# Patient Record
Sex: MALE | Race: WHITE | HISPANIC OR LATINO | URBAN - METROPOLITAN AREA
[De-identification: names, ages, dates, MRNs, and addresses within clinical notes are randomized per-mention and may not be internally consistent; named-entity substitution may affect disease eponyms.]

---

## 2018-04-07 ENCOUNTER — EMERGENCY (EMERGENCY)
Facility: HOSPITAL | Age: 58
LOS: 1 days | End: 2018-04-07
Payer: MEDICAID

## 2018-04-07 PROCEDURE — 99284 EMERGENCY DEPT VISIT MOD MDM: CPT

## 2018-04-09 ENCOUNTER — EMERGENCY (EMERGENCY)
Facility: HOSPITAL | Age: 58
LOS: 1 days | End: 2018-04-09
Payer: MEDICAID

## 2018-04-09 PROCEDURE — 99285 EMERGENCY DEPT VISIT HI MDM: CPT

## 2018-04-09 PROCEDURE — 76705 ECHO EXAM OF ABDOMEN: CPT | Mod: 26

## 2018-04-09 PROCEDURE — 71046 X-RAY EXAM CHEST 2 VIEWS: CPT | Mod: 26

## 2018-04-09 PROCEDURE — 74177 CT ABD & PELVIS W/CONTRAST: CPT | Mod: 26

## 2018-08-18 ENCOUNTER — EMERGENCY (EMERGENCY)
Facility: HOSPITAL | Age: 58
LOS: 1 days | End: 2018-08-18
Payer: MEDICAID

## 2018-08-18 PROCEDURE — 99284 EMERGENCY DEPT VISIT MOD MDM: CPT

## 2018-08-18 PROCEDURE — 71045 X-RAY EXAM CHEST 1 VIEW: CPT | Mod: 26

## 2018-10-25 ENCOUNTER — EMERGENCY (EMERGENCY)
Facility: HOSPITAL | Age: 58
LOS: 1 days | End: 2018-10-25
Payer: MEDICAID

## 2018-10-25 PROCEDURE — 71046 X-RAY EXAM CHEST 2 VIEWS: CPT | Mod: 26

## 2018-10-25 PROCEDURE — 99284 EMERGENCY DEPT VISIT MOD MDM: CPT

## 2018-12-26 ENCOUNTER — EMERGENCY (EMERGENCY)
Facility: HOSPITAL | Age: 58
LOS: 1 days | End: 2018-12-26

## 2019-02-06 ENCOUNTER — EMERGENCY (EMERGENCY)
Facility: HOSPITAL | Age: 59
LOS: 1 days | End: 2019-02-06
Admitting: EMERGENCY MEDICINE
Payer: MEDICAID

## 2019-02-06 PROCEDURE — 99284 EMERGENCY DEPT VISIT MOD MDM: CPT

## 2019-07-27 ENCOUNTER — EMERGENCY (EMERGENCY)
Facility: HOSPITAL | Age: 59
LOS: 1 days | End: 2019-07-27
Admitting: EMERGENCY MEDICINE
Payer: MEDICAID

## 2019-07-27 PROCEDURE — 99284 EMERGENCY DEPT VISIT MOD MDM: CPT

## 2019-07-27 PROCEDURE — 76705 ECHO EXAM OF ABDOMEN: CPT | Mod: 26

## 2019-07-27 PROCEDURE — 74177 CT ABD & PELVIS W/CONTRAST: CPT | Mod: 26

## 2019-09-24 ENCOUNTER — EMERGENCY (EMERGENCY)
Facility: HOSPITAL | Age: 59
LOS: 1 days | End: 2019-09-24
Admitting: EMERGENCY MEDICINE
Payer: MEDICAID

## 2019-09-24 PROCEDURE — 99284 EMERGENCY DEPT VISIT MOD MDM: CPT

## 2019-09-24 PROCEDURE — 73502 X-RAY EXAM HIP UNI 2-3 VIEWS: CPT | Mod: 26,RT

## 2019-10-17 PROBLEM — Z00.00 ENCOUNTER FOR PREVENTIVE HEALTH EXAMINATION: Status: ACTIVE | Noted: 2019-10-17

## 2019-10-18 ENCOUNTER — APPOINTMENT (OUTPATIENT)
Dept: NEUROSURGERY | Facility: CLINIC | Age: 59
End: 2019-10-18

## 2019-10-20 ENCOUNTER — EMERGENCY (EMERGENCY)
Facility: HOSPITAL | Age: 59
LOS: 1 days | End: 2019-10-20
Admitting: EMERGENCY MEDICINE
Payer: MEDICAID

## 2019-10-20 PROCEDURE — 71045 X-RAY EXAM CHEST 1 VIEW: CPT | Mod: 26

## 2019-10-20 PROCEDURE — 99285 EMERGENCY DEPT VISIT HI MDM: CPT

## 2019-10-21 PROCEDURE — 93010 ELECTROCARDIOGRAM REPORT: CPT

## 2019-11-12 ENCOUNTER — EMERGENCY (EMERGENCY)
Facility: HOSPITAL | Age: 59
LOS: 1 days | End: 2019-11-12
Admitting: EMERGENCY MEDICINE
Payer: MEDICAID

## 2019-11-12 PROCEDURE — 99283 EMERGENCY DEPT VISIT LOW MDM: CPT

## 2019-11-12 PROCEDURE — 73140 X-RAY EXAM OF FINGER(S): CPT | Mod: 26,LT,76

## 2019-12-24 ENCOUNTER — APPOINTMENT (OUTPATIENT)
Dept: NEUROSURGERY | Facility: CLINIC | Age: 59
End: 2019-12-24
Payer: MEDICAID

## 2019-12-24 VITALS
SYSTOLIC BLOOD PRESSURE: 126 MMHG | DIASTOLIC BLOOD PRESSURE: 87 MMHG | HEART RATE: 89 BPM | WEIGHT: 152 LBS | BODY MASS INDEX: 23.86 KG/M2 | HEIGHT: 67 IN

## 2019-12-24 DIAGNOSIS — Z78.9 OTHER SPECIFIED HEALTH STATUS: ICD-10-CM

## 2019-12-24 PROCEDURE — 99204 OFFICE O/P NEW MOD 45 MIN: CPT

## 2019-12-24 NOTE — PLAN
[FreeTextEntry1] : This is a 59 year old male who presents to the office today with complaints of axial back pain with intermittent left lumbar radiculopathy. His exam reveals weakness with dorsiflexion on the left as well as decreased sensation to light touch among the L5 and S1 nerve dermatome. He unfortunately does not have an updated MRI for my review today. I've recommended obtaining a new MR for further evaluation of the L4-5 and L5-S1 levels. Once the MR has been performed we can have him back in the office to review the images. I've also recommended continued antiinflammatory. I can only give him Tramadol as needed for pain at this time as he has not yet had surgery and may not be a surgical candidate. He should otherwise follow-up with his primary care physician.

## 2019-12-24 NOTE — REASON FOR VISIT
[New Patient Visit] : a new patient visit [Referred By: _________] : Patient was referred by [place] [FreeTextEntry1] : Lower back pain.
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
## 2019-12-24 NOTE — PHYSICAL EXAM
[General Appearance - Alert] : alert [General Appearance - Well Nourished] : well nourished [General Appearance - In No Acute Distress] : in no acute distress [General Appearance - Well Developed] : well developed [General Appearance - Well-Appearing] : healthy appearing [] : normal voice and communication [Time] : oriented to time [Place] : oriented to place [Person] : oriented to person [Cranial Nerves Trigeminal (V)] : facial sensation intact symmetrically [Cranial Nerves Optic (II)] : visual acuity intact bilaterally,  pupils equal round and reactive to light [Cranial Nerves Oculomotor (III)] : extraocular motion intact [Cranial Nerves Glossopharyngeal (IX)] : tongue and palate midline [Cranial Nerves Vestibulocochlear (VIII)] : hearing was intact bilaterally [Cranial Nerves Facial (VII)] : face symmetrical [Cranial Nerves Hypoglossal (XII)] : there was no tongue deviation with protrusion [Motor Tone] : muscle tone was normal in all four extremities [Cranial Nerves Accessory (XI - Cranial And Spinal)] : head turning and shoulder shrug symmetric [Involuntary Movements] : no involuntary movements were seen [No Muscle Atrophy] : normal bulk in all four extremities [Motor Handedness Left-Handed] : the patient is left hand dominant [Sensation Tactile Decrease] : light touch was intact [Abnormal Walk] : normal gait [Proprioception] : proprioception was intact [Sensation Vibration Decrease] : vibration was intact [Balance] : balance was intact [2+] : Patella left 2+ [Able to toe walk] : the patient was able to toe walk [Normal] : normal [Able to heel walk] : the patient was able to heel walk [Pain / Temp Decrease Sole Of Foot & Posterior Leg Bilateral] : S1 sensory impairment [4] : 4/5 Great Toe Extension (L5) [2] : 2/4 Knee Jerk Reflex [5] : 5/5 Ankle Plantar Flexion (S1) [0] : 0/4 Plantar Reflex

## 2020-01-24 ENCOUNTER — APPOINTMENT (OUTPATIENT)
Dept: NEUROSURGERY | Facility: CLINIC | Age: 60
End: 2020-01-24
Payer: MEDICAID

## 2020-01-24 DIAGNOSIS — M54.9 DORSALGIA, UNSPECIFIED: ICD-10-CM

## 2020-01-24 PROCEDURE — 99214 OFFICE O/P EST MOD 30 MIN: CPT

## 2020-01-24 NOTE — PHYSICAL EXAM
[General Appearance - Alert] : alert [General Appearance - In No Acute Distress] : in no acute distress [General Appearance - Well Nourished] : well nourished [General Appearance - Well-Appearing] : healthy appearing [General Appearance - Well Developed] : well developed [] : normal voice and communication [Balance] : balance was intact [Antalgic] : antalgic [FreeTextEntry1] : well appearing male

## 2020-01-24 NOTE — ASSESSMENT
[FreeTextEntry1] : This is a 59 year old male with complaints of chronic back pain. He is neurologically intact on exam. His MRI is essentially normal and there is no role for any surgical intervention. He can follow-up with a pain management to better control his pain and continue to follow-up with his primary care doctor as regularly scheduled. He is otherwise DISCHARGED FROM MY POINT OF VIEW. 
Patient/Caregiver provided printed discharge information.

## 2020-01-24 NOTE — HISTORY OF PRESENT ILLNESS
[FreeTextEntry1] : Mr. Ko presents to the office today to review the MRI of the lumbar spine. He has been dealing with back pain for some time and has been through injections without any relief. He denies any new symptoms since his previous visit.

## 2020-02-24 ENCOUNTER — EMERGENCY (EMERGENCY)
Facility: HOSPITAL | Age: 60
LOS: 1 days | End: 2020-02-24
Admitting: EMERGENCY MEDICINE
Payer: MEDICAID

## 2020-02-24 PROCEDURE — 70450 CT HEAD/BRAIN W/O DYE: CPT | Mod: 26

## 2020-02-24 PROCEDURE — 99285 EMERGENCY DEPT VISIT HI MDM: CPT

## 2020-02-24 PROCEDURE — 71045 X-RAY EXAM CHEST 1 VIEW: CPT | Mod: 26

## 2020-02-24 PROCEDURE — 72125 CT NECK SPINE W/O DYE: CPT | Mod: 26

## 2020-02-26 ENCOUNTER — EMERGENCY (EMERGENCY)
Facility: HOSPITAL | Age: 60
LOS: 1 days | End: 2020-02-26
Admitting: EMERGENCY MEDICINE
Payer: MEDICAID

## 2020-02-26 PROCEDURE — 70450 CT HEAD/BRAIN W/O DYE: CPT | Mod: 26

## 2020-02-26 PROCEDURE — 99284 EMERGENCY DEPT VISIT MOD MDM: CPT

## 2021-02-20 ENCOUNTER — EMERGENCY (EMERGENCY)
Facility: HOSPITAL | Age: 61
LOS: 1 days | End: 2021-02-20
Admitting: EMERGENCY MEDICINE
Payer: MEDICAID

## 2021-02-20 PROCEDURE — 99283 EMERGENCY DEPT VISIT LOW MDM: CPT

## 2022-07-11 ENCOUNTER — EMERGENCY (EMERGENCY)
Facility: HOSPITAL | Age: 62
LOS: 1 days | Discharge: HOME | End: 2022-07-11
Admitting: EMERGENCY MEDICINE

## 2022-07-11 DIAGNOSIS — R20.2 PARESTHESIA OF SKIN: ICD-10-CM

## 2022-07-11 DIAGNOSIS — M79.661 PAIN IN RIGHT LOWER LEG: ICD-10-CM

## 2022-07-11 DIAGNOSIS — Z98.890 OTHER SPECIFIED POSTPROCEDURAL STATES: ICD-10-CM

## 2022-07-11 PROCEDURE — 99283 EMERGENCY DEPT VISIT LOW MDM: CPT

## 2022-12-22 ENCOUNTER — APPOINTMENT (OUTPATIENT)
Dept: ORTHOPEDIC SURGERY | Facility: CLINIC | Age: 62
End: 2022-12-22
Payer: OTHER MISCELLANEOUS

## 2022-12-22 DIAGNOSIS — S54.21XA INJURY OF RADIAL NERVE AT FOREARM LEVEL, RIGHT ARM, INITIAL ENCOUNTER: ICD-10-CM

## 2022-12-22 PROCEDURE — 99203 OFFICE O/P NEW LOW 30 MIN: CPT

## 2022-12-22 PROCEDURE — 99072 ADDL SUPL MATRL&STAF TM PHE: CPT

## 2022-12-22 NOTE — PHYSICAL EXAM
[de-identified] : - Constitutional: This is a male in no obvious distress.  \par - Psych: Patient is alert and oriented to person, place and time.  Patient has a normal mood and affect.\par - Cardiovascular: Normal pulses throughout the upper extremities.  No significant varicosities are noted in the upper extremities. \par - Respiratory:  Patient exhibits no evidence of shortness of breath or difficulty breathing.\par ---\par \par Examination of her right hand demonstrates a small what he states is bite wound along the dorsal radial aspect of the hand.  There is no swelling or evidence of infection.  He has complaints of numbness along the dorsal radial aspect of the hand.  He also has complaints of numbness along the median nerve distribution with normal sensation along the ulnar nerve distribution.  Provocative signs for carpal tunnel syndrome are negative.  There is a negative Tinel's sign overlying the dorsal radial sensory nerve branch.
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
## 2023-04-17 ENCOUNTER — APPOINTMENT (OUTPATIENT)
Dept: ORTHOPEDIC SURGERY | Facility: CLINIC | Age: 63
End: 2023-04-17
Payer: OTHER MISCELLANEOUS

## 2023-04-17 VITALS — WEIGHT: 158 LBS | HEIGHT: 67 IN | BODY MASS INDEX: 24.8 KG/M2

## 2023-04-17 PROCEDURE — 72100 X-RAY EXAM L-S SPINE 2/3 VWS: CPT

## 2023-04-17 PROCEDURE — 99244 OFF/OP CNSLTJ NEW/EST MOD 40: CPT

## 2023-04-17 RX ORDER — IBUPROFEN 800 MG/1
800 TABLET, FILM COATED ORAL
Refills: 0 | Status: DISCONTINUED | COMMUNITY
End: 2023-04-17

## 2023-04-17 RX ORDER — IBUPROFEN 600 MG/1
600 TABLET, FILM COATED ORAL
Refills: 0 | Status: DISCONTINUED | COMMUNITY
End: 2023-04-17

## 2023-04-17 RX ORDER — TRAMADOL HYDROCHLORIDE 50 MG/1
50 TABLET, COATED ORAL
Qty: 21 | Refills: 0 | Status: DISCONTINUED | COMMUNITY
Start: 2019-12-24 | End: 2023-04-17

## 2023-04-17 RX ORDER — METHOCARBAMOL 750 MG/1
750 TABLET, FILM COATED ORAL 3 TIMES DAILY
Qty: 90 | Refills: 0 | Status: ACTIVE | COMMUNITY
Start: 2023-04-17 | End: 1900-01-01

## 2023-04-17 RX ORDER — GABAPENTIN 100 MG/1
CAPSULE ORAL
Refills: 0 | Status: DISCONTINUED | COMMUNITY
End: 2023-04-17

## 2023-04-17 RX ORDER — OXYCODONE HYDROCHLORIDE AND ACETAMINOPHEN 10; 325 MG/1; MG/1
TABLET ORAL
Refills: 0 | Status: DISCONTINUED | COMMUNITY
End: 2023-04-17

## 2023-04-17 NOTE — DATA REVIEWED
[CT Scan] : CT scan [Cervical Spine] : cervical spine [Report was reviewed and noted in the chart] : The report was reviewed and noted in the chart [I independently reviewed and interpreted images and report] : I independently reviewed and interpreted images and report [FreeTextEntry1] : No fractures noted\par Disc osteophyte complex C5-6 \par Kyphosis

## 2023-04-17 NOTE — ASSESSMENT
[FreeTextEntry1] : Patient will begin Medrol.  Patient advised not to take any NSAIDs while taking the steroids. \par \par Patient given prescription for MRI, follow up after study is completed to discuss results. \par \par Patient will begin physical therapy. \par \par Recommend: - NSAID - Heating pad - Muscle relaxer - Neck stretching exercise - Soft cervical collar - Cervical traction Patient is given neck rehabilitation exercise book. \par \par Recommend: - NSAID - Heating pad - Muscle relaxer - Core strengthening exercise - Hamstring stretching exercise Patient is given back rehabilitation exercise book. \par \par Worker's compensation 100% temporarily disabled
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
## 2023-05-01 ENCOUNTER — APPOINTMENT (OUTPATIENT)
Dept: ORTHOPEDIC SURGERY | Facility: CLINIC | Age: 63
End: 2023-05-01
Payer: OTHER MISCELLANEOUS

## 2023-05-01 ENCOUNTER — NON-APPOINTMENT (OUTPATIENT)
Age: 63
End: 2023-05-01

## 2023-05-01 VITALS — BODY MASS INDEX: 24.8 KG/M2 | HEIGHT: 67 IN | WEIGHT: 158 LBS

## 2023-05-01 PROCEDURE — 99215 OFFICE O/P EST HI 40 MIN: CPT

## 2023-05-01 RX ORDER — TIZANIDINE 4 MG/1
4 TABLET ORAL EVERY 8 HOURS
Qty: 60 | Refills: 0 | Status: ACTIVE | COMMUNITY
Start: 2023-05-01 | End: 1900-01-01

## 2023-05-01 RX ORDER — MELOXICAM 15 MG/1
15 TABLET ORAL
Qty: 30 | Refills: 1 | Status: ACTIVE | COMMUNITY
Start: 2023-05-01 | End: 1900-01-01

## 2023-05-01 NOTE — DISCUSSION/SUMMARY
[Surgical risks reviewed] : Surgical risks reviewed [de-identified] : I discussed non operative and operative treatment options in great detail with the patient. I discussed treatment options, including but not limited to,\par 1. Non operative treatment - rest, NSAID, physical therapy etc.\par 2. Interventional treatment - injections etc.\par 3. Surgical treatment - Right carpal tunnel release \par \par Patient wants to proceed with surgical intervention after failing nonoperative care. I had a lengthy discussion with the patient about the rational and goal of the surgery as well as expected outcome.  I explained postoperative rehabilitation and recovery process to the patient. I discussed risks, benefits and alternatives of the procedure in detail with the patient. I counseled patient about risks and possible complications, including but not limited to, infection, bleeding, nerve injury, arterial and venous injury, single or multiple muscle group weakness, hematoma, DVT, PE, CRPS, MI, wound dehiscence, pillar pain, persistent symptoms, and risks of anesthesia. I explained to the patient that the surgical outcome is unpredictable and there is no guarantee that the symptoms will resolve after the surgery. The patient understands and wishes to proceed. All questions were answered and patient was given information to review.\par
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
## 2023-05-01 NOTE — ASSESSMENT
[FreeTextEntry1] : Patient with symptomatic right carpal tunnel syndrome.\par \par Patient with persistent symptoms refractory to non operative care. Patient is seeking surgical options. Patient is a candidate for surgery after failing extensive non operative care.\par \par Patient given prescription for MRI, follow up after study is completed to discuss results. \par \par Patient will begin physical therapy. \par \par Recommend using heating pad daily.\par \par Robaxin giving patient upset stomach, will switch to tizanidine.\par \par Recommend: - NSAID - Heating pad - Muscle relaxer - Neck stretching exercise - Soft cervical collar - Cervical traction Patient is given neck rehabilitation exercise book. \par \par Recommend: - NSAID - Heating pad - Muscle relaxer - Core strengthening exercise - Hamstring stretching exercise Patient is given back rehabilitation exercise book. \par \par Worker's compensation 100% temporarily disabled

## 2023-05-01 NOTE — DATA REVIEWED
[EMG Nerve Conduction] : A EMG Nerve Conduction test was completed of the [Bilateral] : bilateral [Upper extremity] : upper extremity [Positive] : positive [Consistent with peripheral neuropathy] : consistent with peripheral neuropathy [FreeTextEntry1] : mild bilateral carpal tunnel syndrome

## 2023-05-05 ENCOUNTER — FORM ENCOUNTER (OUTPATIENT)
Age: 63
End: 2023-05-05

## 2023-05-06 ENCOUNTER — RESULT REVIEW (OUTPATIENT)
Age: 63
End: 2023-05-06

## 2023-05-06 ENCOUNTER — APPOINTMENT (OUTPATIENT)
Dept: MRI IMAGING | Facility: CLINIC | Age: 63
End: 2023-05-06
Payer: OTHER MISCELLANEOUS

## 2023-05-06 PROCEDURE — 72148 MRI LUMBAR SPINE W/O DYE: CPT

## 2023-05-06 PROCEDURE — 72141 MRI NECK SPINE W/O DYE: CPT

## 2023-05-15 ENCOUNTER — APPOINTMENT (OUTPATIENT)
Dept: ORTHOPEDIC SURGERY | Facility: CLINIC | Age: 63
End: 2023-05-15
Payer: OTHER MISCELLANEOUS

## 2023-05-15 VITALS — BODY MASS INDEX: 24.8 KG/M2 | WEIGHT: 158 LBS | HEIGHT: 67 IN

## 2023-05-15 PROCEDURE — 99215 OFFICE O/P EST HI 40 MIN: CPT
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
## 2023-05-15 NOTE — ASSESSMENT
[FreeTextEntry1] : Right HNP C4-6\par Central HNP C3-4\par \par Referral to pain management for injections, follow up 2 weeks after injection. \par \par Patient will begin physical therapy. \par \par Recommend using heating pad daily.\par \par Patient given RX for soft cervical collar.\par \par Recommend: - NSAID - Heating pad - Muscle relaxer - Neck stretching exercise - Soft cervical collar - Cervical traction Patient is given neck rehabilitation exercise book. \par \par Worker's compensation 100% temporarily disabled \par \par Follow up in 2 months

## 2023-05-15 NOTE — DATA REVIEWED
[MRI] : MRI [Cervical Spine] : cervical spine [Report was reviewed and noted in the chart] : The report was reviewed and noted in the chart [I independently reviewed and interpreted images and report] : I independently reviewed and interpreted images and report [FreeTextEntry1] : Right HNP C4-6\par Central HNP C3-4

## 2023-05-25 ENCOUNTER — APPOINTMENT (OUTPATIENT)
Dept: ORTHOPEDIC SURGERY | Facility: CLINIC | Age: 63
End: 2023-05-25
Payer: OTHER MISCELLANEOUS

## 2023-05-25 VITALS — BODY MASS INDEX: 24.8 KG/M2 | HEIGHT: 67 IN | WEIGHT: 158 LBS

## 2023-05-25 PROCEDURE — 99215 OFFICE O/P EST HI 40 MIN: CPT | Mod: 25

## 2023-05-25 PROCEDURE — 20526 THER INJECTION CARP TUNNEL: CPT | Mod: RT

## 2023-05-25 NOTE — PROCEDURE
[Carpal Tunnel] : carpal tunnel [Right] : of the right [Other: ____] : [unfilled] [Pain] : pain [Inflammation] : inflammation [Betadine] : betadine [Ethyl Chloride sprayed topically] : ethyl chloride sprayed topically [Sterile technique used] : sterile technique used [___ cc    1%] : Lidocaine ~Vcc of 1%  [___ cc    40mg] : Triamcinolone (Kenalog) ~Vcc of 40 mg  [] : Patient tolerated procedure well [Call if redness, pain or fever occur] : call if redness, pain or fever occur [Apply ice for 15min out of every hour for the next 12-24 hours as tolerated] : apply ice for 15 minutes out of every hour for the next 12-24 hours as tolerated [Patient was advised to rest the joint(s) for ____ days] : patient was advised to rest the joint(s) for [unfilled] days [Previous OTC use and PT nontherapeutic] : patient has tried OTC's including aspirin, Ibuprofen, Aleve, etc or prescription NSAIDS, and/or exercises at home and/or physical therapy without satisfactory response [Patient had decreased mobility in the joint] : patient had decreased mobility in the joint [Risks, benefits, alternatives discussed / Verbal consent obtained] : the risks benefits, and alternatives have been discussed, and verbal consent was obtained
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
## 2023-05-25 NOTE — ASSESSMENT
[FreeTextEntry1] : Right HNP C4-6\par Central HNP C3-4\par \par Patient will continue physical therapy. \par \par Recommend: - NSAID - Heating pad - Muscle relaxer - Neck stretching exercise - Soft cervical collar - Cervical traction Patient is given neck rehabilitation exercise book. \par \par Worker's compensation 100% temporarily disabled \par \par Follow up in 2 months

## 2023-07-05 ENCOUNTER — APPOINTMENT (OUTPATIENT)
Dept: ORTHOPEDIC SURGERY | Facility: CLINIC | Age: 63
End: 2023-07-05
Payer: OTHER MISCELLANEOUS

## 2023-07-05 VITALS — BODY MASS INDEX: 24.8 KG/M2 | WEIGHT: 158 LBS | HEIGHT: 67 IN

## 2023-07-05 PROCEDURE — 99215 OFFICE O/P EST HI 40 MIN: CPT

## 2023-07-05 NOTE — ASSESSMENT
[FreeTextEntry1] : Surgery - Right carpal tunnel release.\par \par Right HNP C4-6\par Central HNP C3-4\par \par Despite patient EMG results showing only mild carpal tunnel, patient has severe pain, numbness, and  strength weakness.  Patient underwent right carpal tunnel injection with 50% relief for 4 weeks.  Patients symptoms have returned.  Patient is unable to have another cortisone injection at this time.  After failing all conservative treatment over the last 8 months, patient is a candidate for right carpal tunnel release.\par \par Patient will continue physical therapy. \par \par Recommend: - NSAID - Heating pad - Muscle relaxer - Neck stretching exercise - Soft cervical collar - Cervical traction Patient is given neck rehabilitation exercise book. \par \par Worker's compensation 100% temporarily disabled \par \par Follow up in 2 months
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
## 2023-07-05 NOTE — DISCUSSION/SUMMARY
[Surgical risks reviewed] : Surgical risks reviewed [de-identified] : Surgical risks reviewed. I discussed non operative and operative treatment options in great detail with the patient. I discussed treatment options, including but not limited to,\par 1. Non operative treatment - rest, NSAID, physical therapy etc.\par 2. Interventional treatment - injections etc.\par 3. Surgical treatment - Right carpal tunnel release \par \par Patient wants to proceed with surgical intervention after failing nonoperative care. I had a lengthy discussion with the patient about the rational and goal of the surgery as well as expected outcome. I explained postoperative rehabilitation and recovery process to the patient. I discussed risks, benefits and alternatives of the procedure in detail with the patient. I counseled patient about risks and possible complications, including but not limited to, infection, bleeding, nerve injury, arterial and venous injury, single or multiple muscle group weakness, hematoma, DVT, PE, CRPS, MI, wound dehiscence, pillar pain, persistent symptoms, and risks of anesthesia. I explained to the patient that the surgical outcome is unpredictable and there is no guarantee that the symptoms will resolve after the surgery. The patient understands and wishes to proceed. All questions were answered and patient was given information to review.

## 2023-07-17 ENCOUNTER — APPOINTMENT (OUTPATIENT)
Dept: ORTHOPEDIC SURGERY | Facility: CLINIC | Age: 63
End: 2023-07-17
Payer: OTHER MISCELLANEOUS

## 2023-07-17 VITALS — BODY MASS INDEX: 24.8 KG/M2 | HEIGHT: 67 IN | WEIGHT: 158 LBS

## 2023-07-17 DIAGNOSIS — S33.5XXA SPRAIN OF LIGAMENTS OF LUMBAR SPINE, INITIAL ENCOUNTER: ICD-10-CM

## 2023-07-17 PROCEDURE — 99215 OFFICE O/P EST HI 40 MIN: CPT

## 2023-07-17 RX ORDER — PREGABALIN 50 MG/1
50 CAPSULE ORAL TWICE DAILY
Qty: 60 | Refills: 1 | Status: ACTIVE | COMMUNITY
Start: 2023-07-17 | End: 1900-01-01
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
## 2023-07-17 NOTE — ASSESSMENT
[FreeTextEntry1] : Right HNP C4-6\par Central HNP C3-4\par \par Waiting for approval for carpal tunnel release \par \par Referral to pain management for injections, follow up 2 weeks after injection. \par \par Patient will begin physical therapy. \par \par Recommend using heating pad daily.\par \par Recommend: - NSAID - Heating pad - Muscle relaxer - Neck stretching exercise - Soft cervical collar - Cervical traction Patient is given neck rehabilitation exercise book. \par \par Worker's compensation 100% temporarily disabled \par \par Follow up in 2 months

## 2023-08-02 ENCOUNTER — RESULT CHARGE (OUTPATIENT)
Age: 63
End: 2023-08-02

## 2023-08-03 DIAGNOSIS — Z01.818 ENCOUNTER FOR OTHER PREPROCEDURAL EXAMINATION: ICD-10-CM

## 2023-08-09 ENCOUNTER — APPOINTMENT (OUTPATIENT)
Dept: RADIOLOGY | Facility: CLINIC | Age: 63
End: 2023-08-09
Payer: OTHER MISCELLANEOUS

## 2023-08-09 ENCOUNTER — LABORATORY RESULT (OUTPATIENT)
Age: 63
End: 2023-08-09

## 2023-08-09 ENCOUNTER — RESULT REVIEW (OUTPATIENT)
Age: 63
End: 2023-08-09

## 2023-08-09 PROCEDURE — 71046 X-RAY EXAM CHEST 2 VIEWS: CPT

## 2023-08-16 ENCOUNTER — APPOINTMENT (OUTPATIENT)
Dept: ORTHOPEDIC SURGERY | Facility: AMBULATORY SURGERY CENTER | Age: 63
End: 2023-08-16
Payer: OTHER MISCELLANEOUS

## 2023-08-16 PROCEDURE — 64721 CARPAL TUNNEL SURGERY: CPT | Mod: RT

## 2023-08-16 PROCEDURE — 64721 CARPAL TUNNEL SURGERY: CPT | Mod: AS,RT

## 2023-08-16 RX ORDER — OXYCODONE AND ACETAMINOPHEN 5; 325 MG/1; MG/1
5-325 TABLET ORAL
Qty: 28 | Refills: 0 | Status: ACTIVE | COMMUNITY
Start: 2023-08-16 | End: 1900-01-01

## 2023-09-07 ENCOUNTER — APPOINTMENT (OUTPATIENT)
Dept: ORTHOPEDIC SURGERY | Facility: CLINIC | Age: 63
End: 2023-09-07
Payer: OTHER MISCELLANEOUS

## 2023-09-07 VITALS — WEIGHT: 158 LBS | HEIGHT: 67 IN | BODY MASS INDEX: 24.8 KG/M2

## 2023-09-07 PROCEDURE — 99024 POSTOP FOLLOW-UP VISIT: CPT

## 2023-09-07 NOTE — HISTORY OF PRESENT ILLNESS
[de-identified] : S/P Right Carpal Tunnel Release on 8/16/23. Patient denies having fevers, chills, SOB. Patient states he feels extreme pain in the palm of his hand. Patient states he finished his Percocet. Patient states he is currently not taking anything for pain. Patient wearing sling.  Today's Pain 9.5/10

## 2023-09-07 NOTE — ASSESSMENT
[FreeTextEntry1] : Right HNP C4-6 Central HNP C3-4  62 y/o patient here today for follow up after carpal tunnel release surgery on 8/16/2023. Patient doing well, reports pain but state that the numbness and tingling have improved significantly.  - Sutures removed in office, streris applied  - Rx given for hand therapy today, focus on stretching and strengthening.  - Recommend: - rest - ice - compression - elevation  Back: Recommend: - NSAID - Heating pad - Muscle relaxer - Neck stretching exercise - Soft cervical collar - Cervical traction Patient is given neck rehabilitation exercise book.   Worker's compensation 100% temporarily disabled   Follow up in 2 months

## 2023-09-07 NOTE — PHYSICAL EXAM
[Bilateral] : hand bilaterally [] : clean and dry incisions [FreeTextEntry3] : R hand incision from carpal tunnel release healed, sutures removed [de-identified] : Positive tinels and phalen on L

## 2023-09-18 ENCOUNTER — APPOINTMENT (OUTPATIENT)
Dept: ORTHOPEDIC SURGERY | Facility: CLINIC | Age: 63
End: 2023-09-18
Payer: OTHER MISCELLANEOUS

## 2023-09-18 PROCEDURE — 99215 OFFICE O/P EST HI 40 MIN: CPT | Mod: 24

## 2023-09-18 RX ORDER — METHYLPREDNISOLONE 4 MG/1
4 TABLET ORAL
Qty: 1 | Refills: 0 | Status: ACTIVE | COMMUNITY
Start: 2023-04-17 | End: 1900-01-01

## 2023-10-12 ENCOUNTER — APPOINTMENT (OUTPATIENT)
Dept: ORTHOPEDIC SURGERY | Facility: CLINIC | Age: 63
End: 2023-10-12
Payer: OTHER MISCELLANEOUS

## 2023-10-12 PROCEDURE — 99214 OFFICE O/P EST MOD 30 MIN: CPT | Mod: 24

## 2023-10-12 RX ORDER — GABAPENTIN 300 MG/1
300 CAPSULE ORAL 3 TIMES DAILY
Qty: 90 | Refills: 1 | Status: ACTIVE | COMMUNITY
Start: 2023-05-01 | End: 1900-01-01

## 2023-10-16 ENCOUNTER — APPOINTMENT (OUTPATIENT)
Dept: ORTHOPEDIC SURGERY | Facility: CLINIC | Age: 63
End: 2023-10-16
Payer: OTHER MISCELLANEOUS

## 2023-10-16 VITALS — HEIGHT: 67 IN | BODY MASS INDEX: 24.8 KG/M2 | WEIGHT: 158 LBS

## 2023-10-16 DIAGNOSIS — G56.02 CARPAL TUNNEL SYNDROME, LEFT UPPER LIMB: ICD-10-CM

## 2023-10-16 DIAGNOSIS — G56.01 CARPAL TUNNEL SYNDROME, RIGHT UPPER LIMB: ICD-10-CM

## 2023-10-16 PROCEDURE — 99214 OFFICE O/P EST MOD 30 MIN: CPT | Mod: 24

## 2023-10-16 RX ORDER — GABAPENTIN 100 MG/1
100 CAPSULE ORAL 3 TIMES DAILY
Qty: 90 | Refills: 1 | Status: ACTIVE | COMMUNITY
Start: 2023-10-16 | End: 1900-01-01

## 2023-10-18 NOTE — REVIEW OF SYSTEMS
[Negative] : Allergic/Immunologic [Left] : left [de-identified] : See HPI Drysol Counseling:  I discussed with the patient the risks of drysol/aluminum chloride including but not limited to skin rash, itching, irritation, burning.

## 2023-11-01 ENCOUNTER — APPOINTMENT (OUTPATIENT)
Dept: ORTHOPEDIC SURGERY | Facility: CLINIC | Age: 63
End: 2023-11-01

## 2023-11-02 ENCOUNTER — APPOINTMENT (OUTPATIENT)
Dept: ORTHOPEDIC SURGERY | Facility: CLINIC | Age: 63
End: 2023-11-02
Payer: MEDICAID

## 2023-11-02 VITALS — WEIGHT: 158 LBS | HEIGHT: 67 IN | BODY MASS INDEX: 24.8 KG/M2

## 2023-11-02 PROCEDURE — 99214 OFFICE O/P EST MOD 30 MIN: CPT

## 2023-11-13 ENCOUNTER — APPOINTMENT (OUTPATIENT)
Dept: ORTHOPEDIC SURGERY | Facility: CLINIC | Age: 63
End: 2023-11-13
Payer: OTHER MISCELLANEOUS

## 2023-11-13 VITALS — WEIGHT: 158 LBS | HEIGHT: 67 IN | BODY MASS INDEX: 24.8 KG/M2

## 2023-11-13 PROCEDURE — 99214 OFFICE O/P EST MOD 30 MIN: CPT | Mod: 24

## 2023-12-22 RX ORDER — GABAPENTIN 600 MG/1
600 TABLET, COATED ORAL 3 TIMES DAILY
Qty: 90 | Refills: 2 | Status: ACTIVE | COMMUNITY
Start: 2023-12-22 | End: 1900-01-01

## 2024-01-15 ENCOUNTER — APPOINTMENT (OUTPATIENT)
Dept: ORTHOPEDIC SURGERY | Facility: CLINIC | Age: 64
End: 2024-01-15
Payer: OTHER MISCELLANEOUS

## 2024-01-15 PROCEDURE — 99214 OFFICE O/P EST MOD 30 MIN: CPT

## 2024-01-15 RX ORDER — DICLOFENAC SODIUM 1% 10 MG/G
1 GEL TOPICAL 4 TIMES DAILY
Qty: 1 | Refills: 2 | Status: ACTIVE | COMMUNITY
Start: 2024-01-15 | End: 1900-01-01

## 2024-01-15 NOTE — ASSESSMENT
[FreeTextEntry1] : Patient presents today for follow up on his neck pain. Still awaiting approval for surgery from . Patient having decreases in strength and ROM, as well as sensation. Disucssed with patient at length that ACDF surgery is still strongly recommended at this time. Patient agrees with this plan.   NECK - Patient given a prescription for Lyrica 50mg BID today for their nerve pain. Advised patient on potential side effects, including drowsiness.  - Prescription given for Voltaren gel today. Advised patient to apply to the area of pain as needed.    - Renewed Metaxalone 800mg TID today  - Waiting for authorization for ACDF  - Recommend NSAIDs PRN - Recommend heating pad use to decrease muscle spasm - Discussed the importance of home exercises, including but not limited to neck stretching and cervical traction  Patient was educated on their diagnosis today. All questions answered and patient expressed understanding.  Follow up PRN then 2 weeks after surgery.

## 2024-01-15 NOTE — PHYSICAL EXAM
[Bilateral] : hand bilaterally [] : no atrophy [FreeTextEntry3] : R hand incision from carpal tunnel release healed, sutures removed [de-identified] : TTP over scar  [de-identified] : Positive tinels and phalen on L

## 2024-02-26 ENCOUNTER — APPOINTMENT (OUTPATIENT)
Dept: ORTHOPEDIC SURGERY | Facility: CLINIC | Age: 64
End: 2024-02-26
Payer: OTHER MISCELLANEOUS

## 2024-02-26 DIAGNOSIS — M50.320 OTHER CERVICAL DISC DEGENERATION, MID-CERVICAL REGION, UNSPECIFIED LEVEL: ICD-10-CM

## 2024-02-26 DIAGNOSIS — M43.6 TORTICOLLIS: ICD-10-CM

## 2024-02-26 DIAGNOSIS — M51.36 OTHER INTERVERTEBRAL DISC DEGENERATION, LUMBAR REGION: ICD-10-CM

## 2024-02-26 DIAGNOSIS — M50.220 OTHER CERVICAL DISC DISPLACEMENT, MID-CERVICAL REGION, UNSPECIFIED LEVEL: ICD-10-CM

## 2024-02-26 DIAGNOSIS — M48.02 SPINAL STENOSIS, CERVICAL REGION: ICD-10-CM

## 2024-02-26 DIAGNOSIS — M40.202 UNSPECIFIED KYPHOSIS, CERVICAL REGION: ICD-10-CM

## 2024-02-26 DIAGNOSIS — M51.37 OTHER INTERVERTEBRAL DISC DEGENERATION, LUMBOSACRAL REGION: ICD-10-CM

## 2024-02-26 DIAGNOSIS — M47.817 SPONDYLOSIS W/OUT MYELOPATHY OR RADICULOPATHY, LUMBOSACRAL REGION: ICD-10-CM

## 2024-02-26 DIAGNOSIS — M54.16 RADICULOPATHY, LUMBAR REGION: ICD-10-CM

## 2024-02-26 DIAGNOSIS — M48.061 SPINAL STENOSIS, LUMBAR REGION WITHOUT NEUROGENIC CLAUDICATION: ICD-10-CM

## 2024-02-26 DIAGNOSIS — M47.22 OTHER SPONDYLOSIS WITH RADICULOPATHY, CERVICAL REGION: ICD-10-CM

## 2024-02-26 DIAGNOSIS — M47.812 SPONDYLOSIS W/OUT MYELOPATHY OR RADICULOPATHY, CERVICAL REGION: ICD-10-CM

## 2024-02-26 PROCEDURE — 99214 OFFICE O/P EST MOD 30 MIN: CPT

## 2024-02-26 RX ORDER — METAXALONE 800 MG/1
800 TABLET ORAL 3 TIMES DAILY
Qty: 90 | Refills: 0 | Status: ACTIVE | COMMUNITY
Start: 2023-11-02 | End: 1900-01-01

## 2024-02-26 NOTE — PHYSICAL EXAM
[Bilateral] : hand bilaterally [] : no atrophy [FreeTextEntry3] : R hand incision from carpal tunnel release healed, sutures removed [de-identified] : TTP over scar  [de-identified] : Positive tinels and phalen on L

## 2024-02-26 NOTE — HISTORY OF PRESENT ILLNESS
[de-identified] : Follow up cervical spine.  WC denied pts ACDF Pt reports some relief with Lyrica and Methocarbamol- needs refills  Pt denies PT or injections since last visit  Pt reports pain radiating down right shoulder and arm- denies numbness or tingling.   Average pain: 10/10

## 2024-02-26 NOTE — ASSESSMENT
[FreeTextEntry1] : Patient presents today for follow up on his neck pain. Still awaiting approval for surgery from . Patient having decreases in strength and ROM, as well as sensation. Patient with persistent right sided radiculopathy and weakness. Recommend an updated EMG of the bilateral upper extremities to further evaluate for extent of nerve involvement given persistent symptoms. Discussed with patient at length that ACDF surgery is still strongly recommended at this time. Patient agrees with this plan.   NECK  - Patient given prescription for EMG/NCS, follow up after study is completed to discuss results.   - Renewed Metaxalone 800mg TID today  - Waiting for authorization for ACDF  - Recommend NSAIDs PRN - Recommend heating pad use to decrease muscle spasm - Discussed the importance of home exercises, including but not limited to neck stretching and cervical traction  Patient was educated on their diagnosis today. All questions answered and patient expressed understanding.  F/U after EMG

## 2024-02-27 RX ORDER — PREGABALIN 50 MG/1
50 CAPSULE ORAL TWICE DAILY
Qty: 60 | Refills: 0 | Status: ACTIVE | COMMUNITY
Start: 2024-01-15 | End: 1900-01-01